# Patient Record
Sex: FEMALE | Race: OTHER | URBAN - METROPOLITAN AREA
[De-identification: names, ages, dates, MRNs, and addresses within clinical notes are randomized per-mention and may not be internally consistent; named-entity substitution may affect disease eponyms.]

---

## 2019-10-22 ENCOUNTER — HOSPITAL ENCOUNTER (OUTPATIENT)
Dept: LAB | Age: 34
Discharge: HOME | End: 2019-10-22

## 2019-10-22 ENCOUNTER — OFFICE VISIT (OUTPATIENT)
Dept: FAMILY MEDICINE CLINIC | Age: 34
End: 2019-10-22

## 2019-10-22 VITALS
HEART RATE: 83 BPM | HEIGHT: 65 IN | SYSTOLIC BLOOD PRESSURE: 170 MMHG | WEIGHT: 208 LBS | BODY MASS INDEX: 34.66 KG/M2 | TEMPERATURE: 98 F | DIASTOLIC BLOOD PRESSURE: 111 MMHG

## 2019-10-22 DIAGNOSIS — Z83.3 FAMILY HISTORY OF DIABETES MELLITUS (DM): ICD-10-CM

## 2019-10-22 DIAGNOSIS — E66.3 OVERWEIGHT: ICD-10-CM

## 2019-10-22 DIAGNOSIS — R03.0 ELEVATED BLOOD-PRESSURE READING WITHOUT DIAGNOSIS OF HYPERTENSION: ICD-10-CM

## 2019-10-22 DIAGNOSIS — Z87.42 HISTORY OF PCOS: ICD-10-CM

## 2019-10-22 DIAGNOSIS — G44.201 INTRACTABLE TENSION-TYPE HEADACHE, UNSPECIFIED CHRONICITY PATTERN: ICD-10-CM

## 2019-10-22 DIAGNOSIS — Z13.9 ENCOUNTER FOR SCREENING: Primary | ICD-10-CM

## 2019-10-22 DIAGNOSIS — E66.01 SEVERE OBESITY (HCC): ICD-10-CM

## 2019-10-22 LAB
ALBUMIN SERPL-MCNC: 4.7 G/DL (ref 3.5–5)
ALBUMIN/GLOB SERPL: 1.2 {RATIO} (ref 1.1–2.2)
ALP SERPL-CCNC: 93 U/L (ref 45–117)
ALT SERPL-CCNC: 85 U/L (ref 12–78)
ANION GAP SERPL CALC-SCNC: 0 MMOL/L (ref 5–15)
AST SERPL-CCNC: 35 U/L (ref 15–37)
BILIRUB SERPL-MCNC: 0.7 MG/DL (ref 0.2–1)
BUN SERPL-MCNC: 12 MG/DL (ref 6–20)
BUN/CREAT SERPL: 18 (ref 12–20)
CALCIUM SERPL-MCNC: 9.8 MG/DL (ref 8.5–10.1)
CHLORIDE SERPL-SCNC: 103 MMOL/L (ref 97–108)
CO2 SERPL-SCNC: 32 MMOL/L (ref 21–32)
CREAT SERPL-MCNC: 0.67 MG/DL (ref 0.55–1.02)
EST. AVERAGE GLUCOSE BLD GHB EST-MCNC: 137 MG/DL
FSH SERPL-ACNC: 5.2 MIU/ML
GLOBULIN SER CALC-MCNC: 3.9 G/DL (ref 2–4)
GLUCOSE SERPL-MCNC: 158 MG/DL (ref 65–100)
HBA1C MFR BLD: 6.4 % (ref 4.2–6.3)
HCG URINE, QL. (POC): NEGATIVE
LH SERPL-ACNC: 6 MIU/ML
POTASSIUM SERPL-SCNC: 3.7 MMOL/L (ref 3.5–5.1)
PROT SERPL-MCNC: 8.6 G/DL (ref 6.4–8.2)
SODIUM SERPL-SCNC: 135 MMOL/L (ref 136–145)
T4 FREE SERPL-MCNC: 1 NG/DL (ref 0.8–1.5)
TSH SERPL DL<=0.05 MIU/L-ACNC: 1.37 UIU/ML (ref 0.36–3.74)
VALID INTERNAL CONTROL?: YES

## 2019-10-22 PROCEDURE — 83036 HEMOGLOBIN GLYCOSYLATED A1C: CPT

## 2019-10-22 PROCEDURE — 84439 ASSAY OF FREE THYROXINE: CPT

## 2019-10-22 PROCEDURE — 84443 ASSAY THYROID STIM HORMONE: CPT

## 2019-10-22 PROCEDURE — 83001 ASSAY OF GONADOTROPIN (FSH): CPT

## 2019-10-22 PROCEDURE — 82670 ASSAY OF TOTAL ESTRADIOL: CPT

## 2019-10-22 PROCEDURE — 80053 COMPREHEN METABOLIC PANEL: CPT

## 2019-10-22 PROCEDURE — 82627 DEHYDROEPIANDROSTERONE: CPT

## 2019-10-22 PROCEDURE — 84146 ASSAY OF PROLACTIN: CPT

## 2019-10-22 NOTE — PROGRESS NOTES
Assessment/Plan:    Diagnoses and all orders for this visit:    1. Encounter for screening  -     AMB POC URINE PREGNANCY TEST, VISUAL COLOR COMPARISON    2. Severe obesity ([address]  -     TSH 3RD GENERATION; Future  -     T4, FREE; Future  -     HEMOGLOBIN A1C WITH EAG; Future    3. Elevated blood-pressure reading without diagnosis of hypertension  -     METABOLIC PANEL, COMPREHENSIVE; Future    4. Overweight  -     TSH 3RD GENERATION; Future  -     T4, FREE; Future  -     HEMOGLOBIN A1C WITH EAG; Future    5. Intractable tension-type headache, unspecified chronicity pattern    6. History of PCOS  -     FSH AND LH; Future  -     PROLACTIN; Future  -     ESTROGENS, FRACTIONATED; Future  -     DHEA SULFATE; Future    7. Family history of diabetes mellitus (DM)  -     HEMOGLOBIN A1C WITH EAG; Future    Close f/up  Get records from ER visit to [facility] one month ago   ? exophthalmus or just fatigue? Follow-up and Dispositions    · Return in about 2 weeks (around 11/5/2019). CARMELLA Allred expressed understanding of this plan. An AVS was printed and given to the patient.      ----------------------------------------------------------------------    Chief Complaint   Patient presents with    Elevated Blood Pressure       History of Present Illness:  37 yo new pt presents for eval of new left sided forehead headache. No current headache. The pain started over a month ago and she went to [facility] ER and was given a \"shot in my vein\" and then the headache went away. At that time, she had normal BP she states. 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 She has hx of PCOS and has not had a period since 2/19. She was on metformin for this problem before moving to the [address] [place] [address] one year ago. She has gained weight since moving here as well.  Her mom has diabetes  The headaches come maybe once a week and last 2 hours. Not associated with any thing that she can tell - for ex no chemicals are used at work, she doesn't have vision problems. She has no nasal or throat problems  She is a non smoker  She is a mom of a 15 yo and . There is no stress at home she states  She is not on any medication daily   She takes tylenol for her headache and this takes the pain away  She has no chest pain       No past medical history on file. No Known Allergies    Social History     Tobacco Use    Smoking status: Never Smoker    Smokeless tobacco: Never Used   Substance Use Topics    Alcohol use: Never     Frequency: Never    Drug use: Never       No family history on file.     Physical Exam:     Visit Vitals  BP (!) 170/111 (BP 1 Location: Right arm, BP Patient Position: Sitting)   Pulse 83   Temp 98 °F (36.7 °C) (Oral)   Ht 5' 4.57\" (1.64 m)   Wt 208 lb (94.3 kg)   LMP 02/22/2019   BMI 35.08 kg/m²     gen looks tired, eyes appear a little protruding but pulse rate is normal   A&Ox3  WDWN NAD  Respirations normal and non labored  PERRLA, no a/v nicking/banking  Neuro CN 2-12 intact  Thyroid- no masses felt, gland feels symmetrically full  Affect is blunted but may be due to no sleep overnight

## 2019-10-22 NOTE — PATIENT INSTRUCTIONS
Cómo comenzar un plan para bajar de peso: Instrucciones de cuidado - [ Starting a Weight Loss Plan: Care Instructions ]  Instrucciones de cuidado    Si está pensando en adelgazar, puede que le resulte difícil saber por dónde empezar. [place] médico puede ayudarle a preparar un plan para bajar de peso que se ajuste mejor a roxanne necesidades. Es posible que prefiera damien iliana clase de nutrición o ejercicio, o unirse a un matthew de [de-identified] para adelgazar. Si tiene preguntas sobre cómo cambiar roxanne hábitos alimenticios o rutina de ejercicio, pregúntele a [place] médico sobre la posibilidad de consultar a un dietista registrado o a un especialista en ejercicio. Bajar de peso puede ser un gran desafío. No tiene que hacer grandes cambios de repente. Keven Burns y Tammy. Cuando esos cambios se conviertan en un hábito, incorpore algunos [employer]. Si marjorie que no está listo para hacer cambios en gianni momento, escoja iliana fecha en el futuro. Mitch iliana karo con fr[place] médico para determinar si es apropiado que comience un plan para bajar de [place]. La atención de seguimiento es iliana parte clave de france tratamiento y seguridad. Asegúrese de hacer y acudir a todas las citas, y llame a france médico si está teniendo problemas. También es iliana buena idea saber los resultados de roxanne exámenes y mantener iliana lista de los medicamentos que brianna. ¿Cómo puede cuidarse en el hogar? · Establezca metas realistas. Muchas personas esperan perder más peso del que es probable. Perder el 5% a 10% del peso corporal podría ser suficiente para mejorar france jaclyn. · Mitch que france jocelyne y roxanne amigos se involucren para brindarle apoyo. Hable con ellos sobre las razones por las que Rohini Flora y [facility]. Pueden ayudarle si participan en roxanne rutinas de ejercicio y comen con usted, aunque es posible que coman algo diferente. · Busque lo que funcione mejor para usted.  Si no tiene tiempo o no le gusta cocinar, un programa que ofrezca barras o batidos carina sustitutos de comida podría ser el más adecuado para usted. Shameka si le gusta cocinar, lo mejor puede ser un plan que incluya menús y recetas diarios. · Pregúntele a miller médico acerca de otros profesionales de la jaclyn que puedan ayudarle a alcanzar roxanne objetivos para bajar de [place]. ? Un dietista puede ayudarle a introducir cambios saludables en miller dieta. ? Un especialista en ejercicio o entrenador personal pueden ayudarle a desarrollar un programa de ejercicio Georgena Carol y seguro. ? Un consejero o psiquiatra puede ayudarle a lidiar con la depresión, la ansiedad u otros problemas familiares que puedan interponerse en miller propósito para adelgazar. · Considere unirse a un matthew de [de-identified] de personas que tratan de adelgazar. [place] médico puede recomendarle grupos de apoyo en miller localidad. ¿Dónde puede encontrar más información en inglés? Stevo Lambert a http://manjula-ruperto.[email or web address] Pat Meneses [id number] en la búsqueda para aprender más acerca de \"Cómo comenzar un plan para bajar de peso: Instrucciones de cuidado - [ Starting a Weight Loss Plan: Care Instructions ]. \"  Revisado: 28 marzo, 2019  Versión del contenido: 12.2  © 7880-7706 Healthwise, Incorporated. Las instrucciones de cuidado fueron adaptadas bajo licencia por [employer] (which disclaims liability or warranty for this information). Si usted tiene [place] afección médica o sobre estas instrucciones, siempre pregunte a miller profesional de jaclyn. Healthwise, Incorporated niega toda garantía o responsabilidad por miller uso de esta información. Presión arterial elevada: Instrucciones de cuidado - [ Elevated Blood Pressure: Care Instructions ]  Instrucciones de cuidado    La presión arterial es iliana medida de la fuerza que ejerce la izaiah contra las zhang de las arterias. Es normal que la presión arterial suba y baje a lo anahy del día. Shameka si se mantiene preethi por un tiempo, usted tiene presión arterial preethi.   Dos números indican miller presión arterial. El primer número es la presión sistólica. Muestra qué tan ursula presiona la izaiah cuando el corazón está bombeando. El henry número es la presión diastólica. Muestra qué tan ursula presiona la [employer] latidos, cuando el corazón está relajado y llenándose de Ka[place]. Eliana Lissette arterial ideal para adultos es menos de 120/80 (diga \"120 sobre 80\"). La presión arterial preethi es de 140/90 o superior. Usted tiene la presión arterial preethi si el número de [place] es 140 o superior o el número de abajo es 90 o superior, o ambas cosas. La prueba principal para la presión arterial preethi es simple, rápida e indolora. Para diagnosticar presión arterial preethi, france médico examinará france presión arterial en momentos diferentes. Después de tomarle la presión, es posible que fr[place] médico le pida que se vuelva a damien la presión en casa. Si se le diagnostica presión arterial preethi, puede colaborar con france médico para elaborar un plan a anahy plazo para manejarla. La atención de seguimiento es iliana parte clave de france tratamiento y seguridad. Asegúrese de hacer y acudir a todas las citas, y llame a france médico si está teniendo problemas. También es iliana buena idea saber los resultados de los exámenes y mantener iliana lista de los medicamentos que brianna. ¿Cómo puede cuidarse en el hogar? · No fume. Fumar aumenta france riesgo de ataque cerebral y ataque al corazón. Si necesita ayuda para dejarlo, hable con fr[place] médico sobre programas y medicamentos para dejar de fumar. Estos pueden aumentar roxanne probabilidades de dejar de fumar para siempre. · Mantenga un peso saludable. · Trate de limitar la cantidad de sodio que ingiere a menos de 2,300 miligramos (mg) al día. [place] médico podría pedirle que trate de ingerir menos de 1,500 mg al día. · Manténgase físicamente activo. Mitch al menos 30 minutos de ejercicio la mayoría de los días de la [place]. Caminar es iliana buena opción.  Es posible que [address] [place] [address] correr, nadar, montar en bicicleta, o practicar tenis o deportes de equipo. · No tome alcohol o limite la cantidad que calvin. Hable con france médico acerca de si puede damien alcohol. · Coma abundantes frutas, verduras y productos lácteos bajos en grasa. Consuma menos grasa saturada y total.  · Aprenda a revisarse la presión arterial en france hogar. ¿Cuándo debe pedir ayuda? Llame a france médico ahora mismo o busque atención médica inmediata si:  ? · [place] presión arterial es mucho más preethi de lo normal (carina 180/110 o superior). ? · Kira que la presión arterial preethi está causando síntomas carina:  ¨ Dolor de ayden intenso. [address] ? Vigile muy de cerca los cambios en france jaclyn, y asegúrese de comunicarse con france médico si:  ? · No mejora carina se esperaba. ¿Dónde puede encontrar más información en inglés? Elinda Bene a http://manjula-ruperto.[email or web address] Nellie Severs D311 en la búsqueda para aprender más acerca de \"Presión arterial elevada: Instrucciones de cuidado - [ Elevated Blood Pressure: Care Instructions ]. \"  Revisado: 21 septiembre, 2016  Versión del contenido: 11.4  © [id number] Healthwise, Incorporated. Las instrucciones de cuidado fueron adaptadas bajo licencia por [employer] (which disclaims liability or warranty for this information). Si usted tiene [place] afección médica o sobre estas instrucciones, siempre pregunte a fr[place] profesional de jaclyn. Healthwise, Incorporated niega toda garantía o responsabilidad por [place] uso de esta información.

## 2019-10-22 NOTE — PROGRESS NOTES
Results for orders placed or performed in visit on 10/22/19   AMB POC URINE PREGNANCY TEST, VISUAL COLOR COMPARISON   Result Value Ref Range    VALID INTERNAL CONTROL POC Yes     HCG urine, Ql. (POC) Negative Negative

## 2019-10-23 DIAGNOSIS — E11.9 DM TYPE 2, GOAL HBA1C < 7% (HCC): Primary | ICD-10-CM

## 2019-10-23 LAB — PROLACTIN SERPL-MCNC: 10.1 NG/ML

## 2019-10-23 RX ORDER — METFORMIN HYDROCHLORIDE 500 MG/1
TABLET ORAL
Qty: 60 TAB | Refills: 2 | Status: SHIPPED | OUTPATIENT
Start: 2019-10-23 | End: 2019-11-12

## 2019-10-23 NOTE — PROGRESS NOTES
T/c to pt to discuss new dx of DM 2. She answered the phone and we discussed the results. She will start on metformin 500 mg one po with dinner for the first week then increase to 2 with dinner. I have an appt with her at that time and will make changes then. She understands that she needs to make diet and lifestyle changes as well.

## 2019-10-24 LAB
DHEA-S SERPL-MCNC: 227.5 UG/DL (ref 84.8–378)
ESTRADIOL SERPL-MCNC: 34.9 PG/ML
ESTRONE SERPL-MCNC: 144 PG/ML

## 2019-10-25 NOTE — PROGRESS NOTES
After review of all of her labs, the most likely dx is PCOS. Can add a testosterone lab at f/up and/or do progesterone challenge.  Recent dx of DM to add to this

## 2019-11-12 ENCOUNTER — OFFICE VISIT (OUTPATIENT)
Dept: FAMILY MEDICINE CLINIC | Age: 34
End: 2019-11-12

## 2019-11-12 VITALS
DIASTOLIC BLOOD PRESSURE: 99 MMHG | HEIGHT: 65 IN | TEMPERATURE: 98.2 F | BODY MASS INDEX: 34.49 KG/M2 | HEART RATE: 82 BPM | WEIGHT: 207 LBS | OXYGEN SATURATION: 99 % | SYSTOLIC BLOOD PRESSURE: 154 MMHG

## 2019-11-12 DIAGNOSIS — Z23 ENCOUNTER FOR IMMUNIZATION: ICD-10-CM

## 2019-11-12 DIAGNOSIS — E11.9 DM TYPE 2, GOAL HBA1C < 7% (HCC): ICD-10-CM

## 2019-11-12 DIAGNOSIS — I10 ESSENTIAL HYPERTENSION: ICD-10-CM

## 2019-11-12 DIAGNOSIS — E66.9 OBESITY (BMI 35.0-39.9 WITHOUT COMORBIDITY): ICD-10-CM

## 2019-11-12 DIAGNOSIS — R73.09 ELEVATED HEMOGLOBIN A1C: Primary | ICD-10-CM

## 2019-11-12 DIAGNOSIS — E11.9 DM TYPE 2, GOAL A1C TO BE DETERMINED (HCC): ICD-10-CM

## 2019-11-12 LAB — GLUCOSE POC: NORMAL MG/DL

## 2019-11-12 RX ORDER — METFORMIN HYDROCHLORIDE 500 MG/1
TABLET ORAL
Qty: 60 TAB | Refills: 2 | Status: SHIPPED | OUTPATIENT
Start: 2019-11-12 | End: 2020-02-11 | Stop reason: SDUPTHER

## 2019-11-12 RX ORDER — METOPROLOL SUCCINATE 50 MG/1
50 TABLET, EXTENDED RELEASE ORAL DAILY
Qty: 30 TAB | Refills: 2 | Status: SHIPPED | OUTPATIENT
Start: 2019-11-12 | End: 2020-02-11

## 2019-11-12 NOTE — PATIENT INSTRUCTIONS
Aprenda acerca de la planificación de comidas para la diabetes - [ Learning About Meal Planning for Diabetes ]  ¿Por qué planificar las comidas? La planificación de comidas puede ser Deysi Adams parte crucial del manejo de la diabetes. Planificar las comidas y los refrigerios con el equilibrio correcto de carbohidratos, proteínas y grasas puede ayudarle a mantener los niveles de azúcar en la izaiah dentro de los límites ideales que estableció junto con france médico.  No tiene que comer alimentos especiales. Puede comer lo mismo que france jocelyne, incluso dulces de vez en cuando. Shameka debe prestar atención a la cantidad y la frecuencia con que come ciertos alimentos. Jerome vez desee colaborar con un dietista o un educador en diabetes certificado. Él o benny puede darle consejos y sugerencias de comidas y puede responder a roxanne preguntas sobre la planificación de comidas. Sera profesional sanitario también puede ayudarle a alcanzar un peso saludable si serge es jann de roxanne objetivos. ¿Qué plan es adecuado para usted? [place] dietista o educador en diabetes puede sugerirle que empiece con el formato de plato o el recuento de carbohidratos. Formato de plato  El formato de plato es iliana manera sencilla de ayudarle a controlar la manera en que se [place]. Usted planifica roxanne comidas aprendiendo a robert la cantidad de espacio que cada alimento debería ocupar en un plato. Usar el formato de plato le ayuda a distribuir los carbohidratos cheryl el día. Puede hacer que le resulte más fácil mantener el nivel de azúcar en la izaiah dentro de los límites ideales. También le ayuda a robert si está comiendo porciones de un tamaño saludable. Para usar el formato de plato, llene la mitad del plato con verduras sin almidón. Añada carne o sustitutos de carne en un cuarto del plato. Ponga iliana verdura con almidón o un grano (carina arroz integral o iliana papa) en el último cuarto del plato.  Puede agregar Deysi Elena de fruta y [address] o yogur descremado o semidescremado, dependiendo de france meta de carbohidratos para cada comida. Estos son algunos consejos para usar el formato de plato:  · Asegúrese de no estar usando un plato demasiado edda. Lo mejor es usar un plato de 9 pulgadas (23 cm). Muchos restaurantes usan platos más grandes. · Acostúmbrese a usar el formato de plato en casa. De serge modo puede luego usarlo cuando coma afuera. · Anote las preguntas que tenga acerca de usar el formato de [place]. Hable con france médico, dietista o educador en diabetes sobre roxanne inquietudes. Recuento de carbohidratos  Con el recuento de carbohidratos, usted planifica las comidas de acuerdo a la cantidad de carbohidratos en cada alimento. Los carbohidratos aumentan el nivel de azúcar en la [place] y con mayor rapidez que otros nutrientes. Se encuentran en postres, panes y cereales y en la fruta. También se encuentran en las verduras con almidón, tales carina las devan y [employer], los granos carina el arroz y la pasta, así carina en la [place] y el yogur. Distribuir el consumo de carbohidratos a lo anahy del día le ayuda a mantener el azúcar en la izaiah en los límites ideales. [place] cantidad diaria depende de varios factores, incluyendo france peso, nivel de [place], los [place] brinana para la diabetes y los objetivos que tenga para roxanne niveles de azúcar en la izaiah. Un dietista registrado o un educador en diabetes puede ayudarle a planear cuántos carbohidratos incluir en cada comida y refrigerio. Iliana guía para la cantidad diaria de carbohidratos es:  · Entre 39 y 61 gramos en cada comida. Eso equivale a 3 o 4 porciones de carbohidratos, aproximadamente. · Entre 15 y 21 gramos para cada refrigerio. Eso equivale a 1 porción de carbohidratos, aproximadamente. La etiqueta nutricional de los alimentos envasados le indica la cantidad de carbohidratos que hay en iliana porción de serge alimento. [place], bhanu cuál es el tamaño de la porción que indica la [place].  ¿Es stephanie porción la cantidad que usted come de Annalisa calvo? Toda la información nutricional en iliana etiqueta se basa en el tamaño de la porción. Así que si usted come Annalisa mayor o phan cantidad, tendrá [employer] cifras. La cantidad total de carbohidratos es lo siguiente que debe buscar en la etiqueta. Si cuenta las porciones de carbohidratos, iliana porción de carbohidratos equivale a 15 gramos. Para los alimentos que no tienen etiquetas, carina las frutas y las verduras frescas, valerie necesitará iliana guía que enumere la cantidad de carbohidratos que contienen esos alimentos. Pregúntele a france médico, dietista o educador en diabetes acerca de libros o guías de nutrición que [employer]. Si [place] insulina, necesita saber cuántos gramos de carbohidratos hay en iliana comida. [place] le permite saber cuánta insulina de acción rápida necesita antes de comer. Si [place] iliana bomba de [place], valerie recibe [place] cantidad walter de insulina a lo anahy del día. Por lo tanto, debe programar la bomba en las comidas para que le suministre insulina adicional a fin de cubrir el aumento del azúcar en la izaiah después de las comidas. Cuando valerie sabe qué cantidad de carbohidratos consumirá, puede programar la cantidad correcta de [place]. O si [place] siempre la misma dosis de insulina, tendrá que asegurarse de consumir la misma cantidad de carbohidratos en cada comida. Si necesita ayuda adicional para comprender el recuento de carbohidratos y las etiquetas de nutrición, pregúntele a france médico, dietista o educador en diabetes. ¿Cómo puede empezar a usar la planificación de comidas? Estos son algunos consejos para empezar:  · Planifique las comidas para toda la semana por anticipado. No se olvide de incluir los refrigerios. · Use libros de cocina o recetas en Internet para planificar varias comidas principales. Planifique algunas comidas rápidas para las noches en las que esté ocupado.  También puede cocinar el doble de algunas comidas que se puedan congelar. Puede guardar la mitad para otras noches en las que esté ocupado y no tenga tiempo para cocinar. · Asegúrese de que tenga los ingredientes que necesita para roxanne recetas. Si tiene poca cantidad de algunos artículos básicos, añádalos a la lista de la compra. · Mitch iliana lista de alimentos que [place] para preparar desayunos, almuerzos y refrigerios. Anote cantidades abundantes de frutas y verduras. · Coloque esta lista en la freddy del refrigerador. Siga añadiendo cosas según se le Thelda Guard ocurriendo. · Lleve la lista consigo cuando vaya a hacer las compras semanales. La atención de seguimiento es iliana parte clave de france tratamiento y seguridad. Asegúrese de hacer y acudir a todas las citas, y llame a france médico si está teniendo problemas. También es iliana buena idea saber los resultados de roxanne exámenes y mantener iliana lista de los medicamentos que brianna. ¿Dónde puede encontrar más información en inglés? Tricia Andrews a [email or web address]-ruperto.[email or web address] Ilana [place] en la búsqueda para aprender más acerca de \"Aprenda acerca de la planificación de comidas para la diabetes - [ Learning About Meal Planning for Diabetes ]. \"  Revisado: 16 jazmine, 2019  Versión del contenido: 12.2  © 6775-9318 Healthwise, Incorporated. Las instrucciones de cuidado fueron adaptadas bajo licencia por [employer] (which disclaims liability or warranty for this information). Si usted tiene [place] afección médica o sobre estas instrucciones, siempre pregunte a france profesional de jaclyn. Healthwise, Incorporated niega toda garantía o responsabilidad por france uso de esta información. Aprenda sobre la presión arterial preethi - [ Learning About High Blood Pressure ]  ¿Qué es la presión arterial preethi? La presión arterial es iliana medida de la fuerza que ejerce la izaiah contra las zhang de las arterias.  Es normal que la presión arterial suba y baje a lo anahy del día, deanna si se mantiene preethi, usted tiene la presión arterial preethi. Otro nombre para la presión arterial preethi es hipertensión. Dos números le indican france presión arterial. El primer número indica la presión sistólica. Esta muestra qué tan ursula presiona la izaiah cuando el corazón está bombeando. El henry número indica la presión diastólica. Esta muestra qué tan ursula presiona la [employer] latidos, cuando el corazón está relajado y se está llenando de [place]. [place] médico le dará un objetivo de presión arterial. El objetivo se basará en france jaclyn y france edad. La presión arterial preethi (hipertensión) significa que el número de [place] se [address], o el número de abajo permanece alto, o ambas cosas. La presión arterial preethi aumenta el riesgo de ataque cerebral, ataque cardíaco y otros problemas. Usted y france médico hablarán sobre los riesgos de estos problemas en relación con france presión arterial.  ¿Qué ocurre cuando tiene presión arterial preethi? · La izaiah fluye por las arterias con Haroldo Crooked. Con el tiempo, esto daña las zhang de las arterias. Shameka usted no puede sentirlo. La presión arterial preethi generalmente no causa síntomas. · La grasa y el calcio empiezan a acumularse en las arterias. Esta acumulación se llama placa. La placa hace que las arterias arbaella más estrechas y [place]. La izaiah no puede fluir a través de ellas tan fácilmente. · Esta falta de un buen flujo de izaiah empieza a dañar [employer] de france cuerpo. [place] puede conducir a problemas carina la enfermedad de las arterias coronarias y un ataque cardíaco, insuficiencia cardíaca, ataque cerebral, insuficiencia renal y [de-identified] a los ojos. ¿Cómo puede prevenir la presión arterial preethi? · Mantenga un peso saludable. · Trate de limitar la cantidad de sodio que consume a menos de 2,300 miligramos (mg) al día. Si limita france consumo de sodio a 1,500 mg al día, puede reducir france presión arterial aún más.   ? Compre alimentos Peggy brooks \"sin sal\" (\"unsalted\"), [place] de sodio\" (\"sodium-free\") o \"bajo en sodio\" (\"low-sodium\"). Los alimentos que indiquen en la etiqueta \"reducido en sodio\" (\"reduced-sodium\") y \"poco sodio\" (\"light sodium\") aún pueden contener demasiado sodio. ? Sazone roxanne comidas con ajo, jugo de yash, cebolla, vinagre, hierbas y especias en lugar de sal. No use salsa de soya, salsa para dionne, sal de cebolla, sal de ajo, mostaza ni ketchup (salsa de tomate) en roxanne alimentos. ? Use menos sal (o nada) de lo que indique la receta. Por lo general, puede añadir la mitad de la cantidad de debbie que pide la receta sin que la comida pierda el sabor. · Manténgase activo físicamente. [place] por lo menos 30 minutos de ejercicio la mayoría de los días de la [place]. Caminar es iliana buena opción. Zeinab Rizo desee hacer otras actividades, carina gaurang su, [employer], jugar al tenis o practicar otros deportes de equipo. · Limite el alcohol a 2 bebidas al día para los hombres y 1 bebida al día para las mujeres. · Coma muchas frutas, verduras y productos lácteos bajos en grasa. Coma menos grasas saturadas y grasas totales. ¿Cómo se trata la presión arterial preethi? · Miller médico sugerirá que randolph cambios en miller estilo de hanna para ayudar al corazón. Por ejemplo, miller médico podría pedirle que coma alimentos saludables, deje de fumar, baje el peso que tenga de [facility] orlemirna y [address]. · Si los cambios de [facility] de hanna no [place] branch, miller médico podría recomendarle que tome medicamentos. · Cuando la presión arterial es muy preethi, se necesitan medicamentos para bajarla. La atención de seguimiento es iliana parte clave de miller tratamiento y seguridad. Asegúrese de hacer y acudir a todas las citas, y llame a miller médico si está teniendo problemas. También es iliana buena idea saber los resultados de roxanne exámenes y mantener iliana lista de los medicamentos que brianna. ¿Dónde puede encontrar más información en inglés? Jerson Henry a http://manjula-ruperto.[email or web address]   Serena Severs P50 en la búsqueda para aprender más acerca de \"Aprenda sobre la presión arterial preethi - [ Learning About High Blood Pressure ]. \"  Revisado: 9 abril, 2019  Versión del contenido: 12.2  © 0006-4162 [employer]. Las instrucciones de cuidado fueron adaptadas bajo licencia por [employer] (which disclaims liability or warranty for this information). Si usted tiene [place] afección médica o sobre estas instrucciones, siempre pregunte a france profesional de jaclyn. [employer] niega toda garantía o responsabilidad por france uso de esta información.

## 2019-11-12 NOTE — PROGRESS NOTES
Coordination of Care  1. Have you been to the ER, urgent care clinic since your last visit? Hospitalized since your last visit? No    2. Have you seen or consulted any other health care providers outside of the [address] [place] [address] since your last visit? Include any pap smears or colon screening. No    Does the patient need refills? YES    Learning Assessment Complete?  yes  Depression Screening complete in the past 12 months? yes  Results for orders placed or performed in visit on 11/12/19   AMB POC GLUCOSE BLOOD, BY GLUCOSE MONITORING DEVICE   Result Value Ref Range    Glucose POC nf 131 mg/dL

## 2019-11-12 NOTE — PROGRESS NOTES
[address] [facility] 190  Requests flu vaccine. Denies fever and egg allergy. VIS information sheet given to patient. Explained possible s/e. Reviewed s/sx indicating need to be seen in ER. Patient had no adverse reaction at time of discharge. Entered into VIIS. GIVEN BY ERNESTINA YOU LPN.  Janice Ding RN

## 2019-12-10 ENCOUNTER — OFFICE VISIT (OUTPATIENT)
Dept: FAMILY MEDICINE CLINIC | Age: 34
End: 2019-12-10

## 2019-12-10 VITALS — HEIGHT: 64 IN | WEIGHT: 203 LBS | BODY MASS INDEX: 34.66 KG/M2

## 2019-12-10 DIAGNOSIS — Z71.3 DIETARY COUNSELING AND SURVEILLANCE: Primary | ICD-10-CM

## 2019-12-10 NOTE — PROGRESS NOTES
DATE: 12/10/2019      REFERRING PHYSICIAN: Vilma Singh  NAME: Denzel Griffin : 1985 AGE: 36 y.o. GENDER: female  REASON FOR VISIT: elevated A1C    ASSESSMENT:  Past Medical Hx: high blood pressure      LABS:   Lab Results   Component Value Date/Time    Hemoglobin A1c 6.4 (H) 10/22/2019 12:32 PM       MEDICATIONS/SUPPLEMENTS:     Prior to Admission medications    Medication Sig Start Date End Date Taking? Authorizing Provider   metFORMIN (GLUCOPHAGE) 500 mg tablet Si po bid with meals. [place] [address] [facility] al flores con comida 19   Vilma Singh PA   metoprolol succinate (TOPROL-XL) 50 mg XL tablet Take 1 Tab by mouth daily. 19   Vilma Singh PA       FOOD ALLERGIES/INTOLERANCES: NA    ANTHROPOMETRICS:    Ht Readings from Last 1 Encounters:   12/10/19 5' 4\" (1.626 m)      Wt Readings from Last 1 Encounters:   12/10/19 203 lb (92.1 kg)      IBW:120 # +/- 10%  %IBW: 169 % +/- 10%    BMI: 34.8 Category: obesity class II    Reported Wt Hx: no change    Estimated Nutritional Needs:   7128-9128 kcals    Reported Diet Hx:     24 Hour Diet Recall  Breakfast  Beans, pureed  Cream     Lunch     Dinner  Cottage cheese  Avocado  Tomato, fried  [place]illa, 1   Snacks  fruit   Beverages  Milk, whole  Juice, aloe     Exercise/Physical Actvity:    None        Environmental/Social:    Works at [employer] every night 3:30pm-2am  Sleeps a couple hours, then eats something and then back to bed until 11:30am          NUTRITION INTERVENTION:    RD introduced CHO foods and how they affect BG. Discussed that some foods have added sugar and some have naturally occurring sugars. Using food models, RD displayed common CHO foods and their appropriate portion sizes. Emphasized that these foods need to be limited, portioned and always eaten in combination with PRO. Introduced MyPlate and discussed how it can help to ensure both balance and variety.    Reviewed the food groups and what each group contributes to our bodies. PATIENT GOALS:      Specific tips and techniques to facilitate compliance with above recommendations were provided and discussed. Pt was strongly encouraged to begin making necessary changes now, and re-visit the dietitian prn.             Jose Antonio Potter RD

## 2020-01-14 ENCOUNTER — OFFICE VISIT (OUTPATIENT)
Dept: FAMILY MEDICINE CLINIC | Age: 35
End: 2020-01-14

## 2020-01-14 VITALS — WEIGHT: 203 LBS | BODY MASS INDEX: 34.66 KG/M2 | HEIGHT: 64 IN

## 2020-01-14 DIAGNOSIS — Z71.3 DIETARY COUNSELING AND SURVEILLANCE: Primary | ICD-10-CM

## 2020-01-14 NOTE — PROGRESS NOTES
Dave Class was seen for F/U wt loss and elevated A1C  [facility] # [id number]  No change in wt  Pt states no changes in diet  Pt missed appt with provider in December, discussed importance of rescheduling  No longer working  Reviewed CHO foods and appropriate portion sizes, and MyPlate  Wake up 5am and eat breakfast at 8am  Last meal between 5-6 pm  RD unsure of pt motivation to make changes but pt requests F/U with RD.  F/U in 2 months

## 2020-02-11 ENCOUNTER — OFFICE VISIT (OUTPATIENT)
Dept: FAMILY MEDICINE CLINIC | Age: 35
End: 2020-02-11

## 2020-02-11 VITALS
WEIGHT: 200.4 LBS | HEART RATE: 79 BPM | TEMPERATURE: 98.4 F | BODY MASS INDEX: 34.4 KG/M2 | SYSTOLIC BLOOD PRESSURE: 178 MMHG | DIASTOLIC BLOOD PRESSURE: 118 MMHG

## 2020-02-11 DIAGNOSIS — I10 ESSENTIAL HYPERTENSION: Primary | ICD-10-CM

## 2020-02-11 DIAGNOSIS — E28.2 PCOS (POLYCYSTIC OVARIAN SYNDROME): ICD-10-CM

## 2020-02-11 DIAGNOSIS — Z13.9 ENCOUNTER FOR SCREENING: ICD-10-CM

## 2020-02-11 DIAGNOSIS — Z23 ENCOUNTER FOR IMMUNIZATION: ICD-10-CM

## 2020-02-11 DIAGNOSIS — E11.9 DM TYPE 2, GOAL HBA1C < 7% (HCC): ICD-10-CM

## 2020-02-11 LAB
GLUCOSE POC: NORMAL MG/DL
HCG URINE, QL. (POC): NEGATIVE
VALID INTERNAL CONTROL?: YES

## 2020-02-11 RX ORDER — METFORMIN HYDROCHLORIDE 500 MG/1
TABLET ORAL
Qty: 60 TAB | Refills: 3 | Status: SHIPPED | OUTPATIENT
Start: 2020-02-11 | End: 2020-03-20 | Stop reason: SDUPTHER

## 2020-02-11 RX ORDER — ATENOLOL 100 MG/1
100 TABLET ORAL DAILY
Qty: 90 TAB | Refills: 1 | Status: SHIPPED | OUTPATIENT
Start: 2020-02-11 | End: 2020-06-16 | Stop reason: SDUPTHER

## 2020-02-11 NOTE — PROGRESS NOTES
Coordination of Care  1. Have you been to the ER, urgent care clinic since your last visit? Hospitalized since your last visit? No    2. Have you seen or consulted any other health care providers outside of the [address] [place] [address] since your last visit? Include any pap smears or colon screening. No    Does the patient need refills? YES    Learning Assessment Complete?  yes  Depression Screening complete in the past 12 months? yes     Results for orders placed or performed in visit on 02/11/20   AMB POC GLUCOSE BLOOD, BY GLUCOSE MONITORING DEVICE   Result Value Ref Range    Glucose POC  mg/dL   AMB POC URINE PREGNANCY TEST, VISUAL COLOR COMPARISON   Result Value Ref Range    VALID INTERNAL CONTROL POC Yes     HCG urine, Ql. (POC) Negative Negative

## 2020-02-11 NOTE — PROGRESS NOTES
Assessment/Plan:       ICD-10-CM ICD-9-CM    1. Essential hypertension I10 401.9 atenoloL (TENORMIN) 100 mg tablet   2. PCOS (polycystic ovarian syndrome) E28.2 256.4 AMB POC GLUCOSE BLOOD, BY GLUCOSE MONITORING DEVICE      AMB POC URINE PREGNANCY TEST, VISUAL COLOR COMPARISON   3. Encounter for screening Z13.9 V82.9    4. DM type 2, goal HbA1c < 7% ([facility]) E11.9 250.00 metFORMIN (GLUCOPHAGE) 500 mg tablet    Tdap - cut the right thumb last Thursday, no evdicenc of infection. Follow-up and Dispositions    · Return for return 1 month recheck bp and discuss absence of menses treatment. Subjective:     Chief Complaint   Patient presents with    Follow-up     Diabetes    Avni Evans is a 36 y.o. OTHER female who speaks Sinhala.  used? yes   HPI: on metoprolol for 4 months. No headache now, has had recent headache 2 days ago. Took panadol (acetaminophen 500 mg) for her headache. Also doloneurobion which is b vitamins and diclofenac took 1 week ago. No BCP.  ROS:  No increased frequency of urination, No increased thirst;   No weight loss; No blurry vision; No chest pain, dyspnea or TIA's;   No numbness, tingling or pain in extremities; No reports of hypoglycemia. Family History: Her family history is not on file. Social History: She reports that she has never smoked. She has never used smokeless tobacco. She reports that she does not drink alcohol or use drugs. Last took medications: today  Medications:    Current Outpatient Medications   Medication Sig Dispense    metFORMIN (GLUCOPHAGE) 500 mg tablet Si po bid with meals. [place] [address] [place] [employer] veces al flores con comida 60 Tab    metoprolol succinate (TOPROL-XL) 50 mg XL tablet Take 1 Tab by mouth daily. 30 Tab         Objective:     Vitals:    20 1320 20 1325   BP: (!) 175/105 (!) 178/118   Pulse: 75 79   Temp: 98.4 °F (36.9 °C)    TempSrc: Temporal    Weight: 200 lb 6.4 oz (90.9 kg)     No LMP recorded (lmp unknown). Wt Readings from Last 2 Encounters:   02/11/20 200 lb 6.4 oz (90.9 kg)   01/14/20 203 lb (92.1 kg)     Results for orders placed or performed in visit on 02/11/20   AMB POC GLUCOSE BLOOD, BY GLUCOSE MONITORING DEVICE   Result Value Ref Range    Glucose POC  mg/dL   AMB POC URINE PREGNANCY TEST, VISUAL COLOR COMPARISON   Result Value Ref Range    VALID INTERNAL CONTROL POC Yes     HCG urine, Ql. (POC) Negative Negative        Lab Results   Component Value Date/Time    Hemoglobin A1c 6.4 (H) 10/22/2019 12:32 PM     Physical Exam:  Constitutional: She appears well-developed. Eyes: EOM are normal. Pupils are equal, round, and reactive to light. Neck: Neck supple. No thyromegaly present. Cardiovascular: Normal rate, regular rhythm, normal heart sounds and intact distal pulses. No murmur heard. Pulmonary/Chest: Effort normal and breath sounds normal.   Musculoskeletal: She exhibits no edema. No ulcers of the lower extremities. Lab Results   Component Value Date/Time    Creatinine 0.67 10/22/2019 12:32 PM     Lab Results   Component Value Date/Time    GFR est AA >60 10/22/2019 12:32 PM    GFR est non-AA >60 10/22/2019 12:32 PM     No results found for: CHOL, CHOLX, CHLST, CHOLV, HDL, HDLP, LDL, LDLC, DLDLP, TGLX, TRIGL, TRIGP, CHHD, CHHDX  Lab Results   Component Value Date/Time    ALT (SGPT) 85 (H) 10/22/2019 12:32 PM    AST (SGOT) 35 10/22/2019 12:32 PM    Alk. phosphatase 93 10/22/2019 12:32 PM    Bilirubin, total 0.7 10/22/2019 12:32 PM     Assessment/Plan:   Diagnoses and all orders for this visit:    1. Essential hypertension  -     atenoloL (TENORMIN) 100 mg tablet; Take 1 Tab by mouth daily. For blood pressure. In place of toprol XL 50 mg.    2. PCOS (polycystic ovarian syndrome)  -     AMB POC GLUCOSE BLOOD, BY GLUCOSE MONITORING DEVICE  -     AMB POC URINE PREGNANCY TEST, VISUAL COLOR COMPARISON    3. Encounter for screening    4.  DM type 2, goal HbA1c < 7% (HCC)  -     metFORMIN (GLUCOPHAGE) 500 mg tablet; Si po bid with meals. [place] [address] [place] [employer] veces al flores con comida      PCOS, not having periods. Will address next time due to uncontrolled bp today. Patient not pregnant. Blood pressure under Poor control. Greater than 50% of this 25 minute visit was spent in face-to-face counseling/coordination of care regarding diabetes management. Follow-up and Dispositions    · Return for return 1 month recheck bp and discuss absence of menses treatment. Corina Marion, NELSON, FNP-BC, BC-ADM  Kody Carroll expressed understanding of this plan.

## 2020-02-11 NOTE — PROGRESS NOTES
Printed AVS, provided to patient and reviewed. Ordered medications reviewed with pt; no coupons needed for discount. Explained to her that she should stop the Metoprolol and start new medication, Atenalol. Consent form for vaccines reviewed and signed by pt. Vaccine TDap given per protocol in R deltoid. [facility] [place] VIS statement given and reviewed. Potential side effects reviewed. Reviewed reasons to seek emergency assistance. Asked pt to wait 15 minutes after receiving vaccine to make sure she doesn't have a reaction. Follow up appointments made and reviewed with pt. All questions answered and pt verbalized understanding. Liang Read interpreted for this encounter.  Luana Doll RN

## 2020-03-20 ENCOUNTER — OFFICE VISIT (OUTPATIENT)
Dept: FAMILY MEDICINE CLINIC | Age: 35
End: 2020-03-20

## 2020-03-20 DIAGNOSIS — E11.9 DM TYPE 2, GOAL HBA1C < 7% (HCC): ICD-10-CM

## 2020-03-20 DIAGNOSIS — I10 ESSENTIAL HYPERTENSION: Primary | ICD-10-CM

## 2020-03-20 RX ORDER — METFORMIN HYDROCHLORIDE 500 MG/1
TABLET ORAL
Qty: 60 TAB | Refills: 3 | Status: SHIPPED | OUTPATIENT
Start: 2020-03-20 | End: 2020-06-16 | Stop reason: SDUPTHER

## 2020-03-20 NOTE — PATIENT INSTRUCTIONS
Current Outpatient Medications   Medication Sig Dispense Refill    metFORMIN (GLUCOPHAGE) 500 mg tablet Si po bid with meals. [place] 1 pastilla dos veces al flores con comida 60 Tab 3    atenoloL (TENORMIN) 100 mg tablet Take 1 Tab by mouth daily. For blood pressure.   In place of toprol XL 50 mg. 90 Tab 1

## 2020-03-20 NOTE — PROGRESS NOTES
Assessment/Plan:       ICD-10-CM ICD-9-CM    1. Essential hypertension I10 401.9 CT BRIEF CHECK IN BY MD/QBEBE   2. DM type 2, goal HbA1c < 7% ([facility]) E11.9 250.00 metFORMIN (GLUCOPHAGE) 500 mg tablet    [phone]  Patient to measure her blood pressure and report it to the provider next week. Follow up appointment: 3 months  Changes/recommendations made today: measure your blood pressure  Current Outpatient Medications   Medication Sig Dispense    metFORMIN (GLUCOPHAGE) 500 mg tablet Si po bid with meals. [place] 1 [employer] veces al flores con comida 60 Tab    atenoloL (TENORMIN) 100 mg tablet Take 1 Tab by mouth daily. For blood pressure. In place of toprol XL 50 mg. 90 Tab     No current facility-administered medications for this visit. [facility]  Subjective:   No chief complaint on file. Hipolito Brush is a 36 y.o. OTHER female who speaks Algerian.  used? yes   HPI: Last month is the last time she took atenolol 50 mg XL. She is taking atenolol 100 mg. Took this morning. No headaches. Refilled on 2020 for 3 months, has another refill. Still with no period. ROS:     No hypoglycemia; No pain in extremities; No numbness or tingling in extremities; No increased frequency of urination,   No blurry vision,  No weight loss,  No chest pain, dyspnea or TIA's;   Not drinking more water  Measuring glucoses? no   Hemoglobin A1c   Date Value Ref Range Status   10/22/2019 6.4 (H) 4.2 - 6.3 % Final      Social History: She reports that she has never smoked. She has never used smokeless tobacco. She reports that she does not drink alcohol or use drugs. Family History: Her family history is not on file. Objective: There were no vitals filed for this visit. No LMP recorded. No results found for any visits on 20.   Wt Readings from Last 2 Encounters:   20 200 lb 6.4 oz (90.9 kg)   20 203 lb (92.1 kg)   Physical Exam: Unable to perform due to telephone visit. Lab Results   Component Value Date/Time    ALT (SGPT) 85 (H) 10/22/2019 12:32 PM    AST (SGOT) 35 10/22/2019 12:32 PM    Alk. phosphatase 93 10/22/2019 12:32 PM    Bilirubin, total 0.7 10/22/2019 12:32 PM     Lab Results   Component Value Date/Time    Creatinine 0.67 10/22/2019 12:32 PM     Lab Results   Component Value Date/Time    GFR est AA >60 10/22/2019 12:32 PM    GFR est non-AA >60 10/22/2019 12:32 PM     Assessment/Plan:   Diagnoses and all orders for this visit:    1. Essential hypertension  -     GA BRIEF CHECK IN BY MD/QHP    2. DM type 2, goal HbA1c < 7% (HCC)  -     metFORMIN (GLUCOPHAGE) 500 mg tablet; Si po bid with meals. [place] [address] [employer] con santana Bliss, DNP, FNP-BC, BC-ADM  Tha Carroll expressed understanding of this plan.

## 2020-03-20 NOTE — PROGRESS NOTES
Avs discussed with Mono Mcguire by Discharge Nurse Tere Vargas LPN. Discussed medication prescribed today. Pt advised to get bp done over the weekend and a follow up call will be made to get the info and along to provider. Patient verbalized understanding and has no further questions.  Tere Vargas LPN

## 2020-03-23 ENCOUNTER — TELEPHONE (OUTPATIENT)
Dept: FAMILY MEDICINE CLINIC | Age: 35
End: 2020-03-23

## 2020-03-23 NOTE — TELEPHONE ENCOUNTER
Tc to the pt. With the assistance of Xu Cali as . The pt stated she went to the [facility]. They told her she had to have a pay and have a provider visit or she could by a BP machine. So the pt could not get her BP done. She did not buy a BP machine. The pt was advised to go to the [place] or [facility] they would have a free bp machine she could to one or the other and have her BP checked and write it down. The nurse stated she would call the pt Wed the pt stated she could try to get a BP by Wed. This week.  Kike Marcus RN

## 2020-03-25 NOTE — TELEPHONE ENCOUNTER
Tc to the pt using [employer] # [id number]. The pt was called to see if she had gone as we had discussed on Monday and checked her BP at the [facility]. The pt stated she had. The pt gave her reading as AY=355/72. The pt was tod that was a good BP. The pt was asked how she was feeling and she stated she is feeling very well. The pt was told the info would be given to the provider and if there was any changes to the plan the pt would be called. The pt verbalized understanding.  Sarah Ramirez RN

## 2020-04-27 ENCOUNTER — TELEPHONE (OUTPATIENT)
Dept: FAMILY MEDICINE CLINIC | Age: 35
End: 2020-04-27

## 2020-06-14 DIAGNOSIS — I10 ESSENTIAL HYPERTENSION: ICD-10-CM

## 2020-06-15 RX ORDER — ATENOLOL 100 MG/1
TABLET ORAL
Qty: 30 TAB | Refills: 0 | OUTPATIENT
Start: 2020-06-15

## 2020-06-16 ENCOUNTER — OFFICE VISIT (OUTPATIENT)
Dept: FAMILY MEDICINE CLINIC | Age: 35
End: 2020-06-16

## 2020-06-16 DIAGNOSIS — I10 ESSENTIAL HYPERTENSION: ICD-10-CM

## 2020-06-16 DIAGNOSIS — E11.9 DM TYPE 2, GOAL HBA1C < 7% (HCC): ICD-10-CM

## 2020-06-16 RX ORDER — ATENOLOL 100 MG/1
100 TABLET ORAL DAILY
Qty: 90 TAB | Refills: 1 | Status: SHIPPED | OUTPATIENT
Start: 2020-06-16 | End: 2020-09-15 | Stop reason: SDUPTHER

## 2020-06-16 RX ORDER — METFORMIN HYDROCHLORIDE 500 MG/1
TABLET ORAL
Qty: 60 TAB | Refills: 3 | Status: SHIPPED | OUTPATIENT
Start: 2020-06-16 | End: 2020-09-15 | Stop reason: SDUPTHER

## 2020-06-16 NOTE — PROGRESS NOTES
Denae Andrade is a 36 y.o. female evaluated via telephone at [phone] on 2020. Patient identification verified with 2 identifiers. Consent: She and/or health care decision maker has provided verbal consent to proceed: Yes Pursuant to the emergency declaration under the [address] [facility], [address] [facility] and the [employer] and [employer] Act, this Virtual Telephone Visit was conducted to reduce the patient's risk of exposure to COVID-19. [place] [employer]  #: [id number]  Chief Complaint   Patient presents with    Follow Up Chronic Condition     DM     HPI needs refills. Last office visit 3/2020. Was to report blood pressures to us. She bought the blood pressure machine. But she's not at home. Doesn't remember the measurements. Also complains of back pain, 6/10. No injury. Since 15 days ago. Not affecting ADLs. No bowel or bladder incontinence. Takes espamofin and it works. No LMP recorded. Last period was more than a year ago. Denies possibility of pregnancy. Documentation:  I communicated with the patient and/or health care decision maker about:  Diagnoses and all orders for this visit:    1. Essential hypertension  -     atenoloL (TENORMIN) 100 mg tablet; Take 1 Tab by mouth daily. For blood pressure. In place of toprol XL 50 mg.    2. DM type 2, goal HbA1c < 7% ([facility])  -     metFORMIN (GLUCOPHAGE) 500 mg tablet; Si po bid with meals. [place] [address] [employer] veces al flores con comida      Follow-up and Dispositions    · Return in about 3 months (around 2020) for hypertension, diabetes. Details of this discussion including any medical advice provided: please measure blood pressures and have available for next time.   Total Time: minutes: 11-20 minutes  I affirm this is a Patient Initiated Episode with a Patient who has not had a related appointment within my department in the past 7 days or scheduled within the next 24 hours. CHRISTIANO Ray [place] Leroy Ly expressed understanding and agreed to this plan.

## 2020-06-16 NOTE — PROGRESS NOTES
Coordination of Care  1. Have you been to the ER, urgent care clinic since your last visit? Hospitalized since your last visit? No    2. Have you seen or consulted any other health care providers outside of the [address] [place] [address] since your last visit? Include any pap smears or colon screening. No    Does the patient need refills? YES    Learning Assessment Complete? no  Depression Screening complete in the past 12 months? yes     Liz Luciano was the .

## 2020-06-25 NOTE — PROGRESS NOTES
I have reviewed the notes, assessments, and/or procedures performed by Adalberto Singh NP, I concur with her documentation of [id number] [facility] [address].

## 2020-09-15 ENCOUNTER — VIRTUAL VISIT (OUTPATIENT)
Dept: FAMILY MEDICINE CLINIC | Age: 35
End: 2020-09-15

## 2020-09-15 DIAGNOSIS — E11.9 DM TYPE 2, GOAL HBA1C < 7% (HCC): ICD-10-CM

## 2020-09-15 DIAGNOSIS — I10 UNCONTROLLED HYPERTENSION: Primary | ICD-10-CM

## 2020-09-15 PROCEDURE — 99442 PR PHYS/QHP TELEPHONE EVALUATION 11-20 MIN: CPT | Performed by: NURSE PRACTITIONER

## 2020-09-15 RX ORDER — LISINOPRIL 5 MG/1
5 TABLET ORAL DAILY
Qty: 90 TAB | Refills: 0 | Status: SHIPPED | OUTPATIENT
Start: 2020-09-15 | End: 2020-10-12 | Stop reason: SDUPTHER

## 2020-09-15 RX ORDER — ATENOLOL 100 MG/1
100 TABLET ORAL DAILY
Qty: 90 TAB | Refills: 0 | Status: SHIPPED | OUTPATIENT
Start: 2020-09-15 | End: 2020-10-12 | Stop reason: SDUPTHER

## 2020-09-15 RX ORDER — METFORMIN HYDROCHLORIDE 500 MG/1
TABLET ORAL
Qty: 60 TAB | Refills: 3 | Status: SHIPPED | OUTPATIENT
Start: 2020-09-15 | End: 2020-10-12 | Stop reason: SDUPTHER

## 2020-09-15 NOTE — PROGRESS NOTES
Coordination of Care  1. Have you been to the ER, urgent care clinic since your last visit? Hospitalized since your last visit? No    2. Have you seen or consulted any other health care providers outside of the [address] [place] [address] since your last visit? Include any pap smears or colon screening. No    Does the patient need refills? YES    Learning Assessment Complete?  yes  Depression Screening complete in the past 12 months? yes

## 2020-09-15 NOTE — PROGRESS NOTES
Max Young is a 36 y.o. female evaluated via telephone at [phone] on 9/15/2020. Patient identification verified with 2 identifiers. Consent: She and/or health care decision maker has provided verbal consent to proceed: Yes Pursuant to the emergency declaration under the [address] [facility], [address] [facility] and the [employer] and [employer] Act, this Virtual Telephone Visit was conducted to reduce the patient's risk of exposure to COVID-19. : Alvina Sarkar  [facility]   Chief Complaint   Patient presents with    Diabetes     f/u    Hypertension     f/u, med refill      Lab Results   Component Value Date/Time    Hemoglobin A1c 6.4 (H) 10/22/2019 12:32 PM   -will need blood pressure measured; needs urine microalb, cmp, a1c, cbc  -the nurse who measured her blood pressure at work today said that it was [place] little high\". She does not know what the numbers were.  -last took atenolol this morning.  --she has measured her blood glucoses at home, doesn't have the numbers with her. Add lisinopril 5 mg daily  No LMP recorded. no chance of pregnancy she states due to \"cysts\"   It's been a long time since she's had her period. Documentation:  I communicated with the patient and/or health care decision maker about:  Diagnoses and all orders for this visit:    1. Uncontrolled hypertension  -     atenoloL (TENORMIN) 100 mg tablet; Take 1 Tab by mouth daily. For blood pressure. In place of toprol XL 50 mg.  -     lisinopriL (PRINIVIL, ZESTRIL) 5 mg tablet; Take 1 Tab by mouth daily.  -     METABOLIC PANEL, COMPREHENSIVE; Future    2. DM type 2, goal HbA1c < 7% ([facility])  -     metFORMIN (GLUCOPHAGE) 500 mg tablet; Si po bid with meals. [place] [address] [employer] veces al flores con comida  -     HEMOGLOBIN A1C WITH EAG; Future  -     CBC WITH AUTOMATED DIFF;  Future  -     MICROALBUMIN, UR, RAND W/ MICROALB/CREAT RATIO; Future    URGENT nonfasting cmp, urine microalb, a1c, cbc with diff    Details of this discussion including any medical advice provided: return F2F 1 month for labs and PE/evaluate bp. Her last K+ was at the low end of normal so I am choosing lisinopril as add on therapy. Discussed do not take lisinopril if pregnant. Total Time: minutes: 11-20 minutes  I affirm this is a Patient Initiated Episode with a Patient who has not had a related appointment within my department in the past 7 days or scheduled within the next 24 hours. Maninder John, CHRISTIANO Harper Emmanuel Agent expressed understanding and agreed to this plan.

## 2020-10-12 ENCOUNTER — OFFICE VISIT (OUTPATIENT)
Dept: FAMILY MEDICINE CLINIC | Age: 35
End: 2020-10-12

## 2020-10-12 ENCOUNTER — HOSPITAL ENCOUNTER (OUTPATIENT)
Dept: LAB | Age: 35
Discharge: HOME | End: 2020-10-12

## 2020-10-12 VITALS
TEMPERATURE: 97.5 F | DIASTOLIC BLOOD PRESSURE: 87 MMHG | BODY MASS INDEX: 36.32 KG/M2 | OXYGEN SATURATION: 100 % | WEIGHT: 205 LBS | HEART RATE: 49 BPM | SYSTOLIC BLOOD PRESSURE: 133 MMHG | HEIGHT: 63 IN

## 2020-10-12 DIAGNOSIS — E11.9 DM TYPE 2, GOAL HBA1C < 7% (HCC): ICD-10-CM

## 2020-10-12 DIAGNOSIS — I10 ESSENTIAL HYPERTENSION: Primary | ICD-10-CM

## 2020-10-12 DIAGNOSIS — Z23 NEEDS FLU SHOT: ICD-10-CM

## 2020-10-12 LAB
ALBUMIN SERPL-MCNC: 4.1 G/DL (ref 3.5–5)
ALBUMIN/GLOB SERPL: 1.1 {RATIO} (ref 1.1–2.2)
ALP SERPL-CCNC: 82 U/L (ref 45–117)
ALT SERPL-CCNC: 44 U/L (ref 12–78)
ANION GAP SERPL CALC-SCNC: 6 MMOL/L (ref 5–15)
AST SERPL-CCNC: 22 U/L (ref 15–37)
BASOPHILS # BLD: 0 K/UL (ref 0–0.1)
BASOPHILS NFR BLD: 0 % (ref 0–1)
BILIRUB SERPL-MCNC: 0.5 MG/DL (ref 0.2–1)
BUN SERPL-MCNC: 14 MG/DL (ref 6–20)
BUN/CREAT SERPL: 17 (ref 12–20)
CALCIUM SERPL-MCNC: 10.1 MG/DL (ref 8.5–10.1)
CHLORIDE SERPL-SCNC: 103 MMOL/L (ref 97–108)
CO2 SERPL-SCNC: 30 MMOL/L (ref 21–32)
CREAT SERPL-MCNC: 0.81 MG/DL (ref 0.55–1.02)
CREAT UR-MCNC: 51.3 MG/DL
DIFFERENTIAL METHOD BLD: NORMAL
EOSINOPHIL # BLD: 0.2 K/UL (ref 0–0.4)
EOSINOPHIL NFR BLD: 3 % (ref 0–7)
ERYTHROCYTE [DISTWIDTH] IN BLOOD BY AUTOMATED COUNT: 12.5 % (ref 11.5–14.5)
EST. AVERAGE GLUCOSE BLD GHB EST-MCNC: 120 MG/DL
GLOBULIN SER CALC-MCNC: 3.6 G/DL (ref 2–4)
GLUCOSE POC: NORMAL MG/DL
GLUCOSE SERPL-MCNC: 96 MG/DL (ref 65–100)
HBA1C MFR BLD: 5.8 % (ref 4–5.6)
HCT VFR BLD AUTO: 42.9 % (ref 35–47)
HGB BLD-MCNC: 13.8 G/DL (ref 11.5–16)
IMM GRANULOCYTES # BLD AUTO: 0 K/UL (ref 0–0.04)
IMM GRANULOCYTES NFR BLD AUTO: 0 % (ref 0–0.5)
LYMPHOCYTES # BLD: 2.4 K/UL (ref 0.8–3.5)
LYMPHOCYTES NFR BLD: 37 % (ref 12–49)
MCH RBC QN AUTO: 27.5 PG (ref 26–34)
MCHC RBC AUTO-ENTMCNC: 32.2 G/DL (ref 30–36.5)
MCV RBC AUTO: 85.5 FL (ref 80–99)
MICROALBUMIN UR-MCNC: 1.01 MG/DL
MICROALBUMIN/CREAT UR-RTO: 20 MG/G (ref 0–30)
MONOCYTES # BLD: 0.5 K/UL (ref 0–1)
MONOCYTES NFR BLD: 8 % (ref 5–13)
NEUTS SEG # BLD: 3.4 K/UL (ref 1.8–8)
NEUTS SEG NFR BLD: 52 % (ref 32–75)
NRBC # BLD: 0 K/UL (ref 0–0.01)
NRBC BLD-RTO: 0 PER 100 WBC
PLATELET # BLD AUTO: 296 K/UL (ref 150–400)
PMV BLD AUTO: 11.3 FL (ref 8.9–12.9)
POTASSIUM SERPL-SCNC: 3.8 MMOL/L (ref 3.5–5.1)
PROT SERPL-MCNC: 7.7 G/DL (ref 6.4–8.2)
RBC # BLD AUTO: 5.02 M/UL (ref 3.8–5.2)
SODIUM SERPL-SCNC: 139 MMOL/L (ref 136–145)
WBC # BLD AUTO: 6.5 K/UL (ref 3.6–11)

## 2020-10-12 PROCEDURE — 80053 COMPREHEN METABOLIC PANEL: CPT

## 2020-10-12 PROCEDURE — 99213 OFFICE O/P EST LOW 20 MIN: CPT | Performed by: NURSE PRACTITIONER

## 2020-10-12 PROCEDURE — 90686 IIV4 VACC NO PRSV 0.5 ML IM: CPT

## 2020-10-12 PROCEDURE — 90471 IMMUNIZATION ADMIN: CPT

## 2020-10-12 PROCEDURE — 82043 UR ALBUMIN QUANTITATIVE: CPT

## 2020-10-12 PROCEDURE — 83036 HEMOGLOBIN GLYCOSYLATED A1C: CPT

## 2020-10-12 PROCEDURE — 82962 GLUCOSE BLOOD TEST: CPT | Performed by: NURSE PRACTITIONER

## 2020-10-12 PROCEDURE — 85025 COMPLETE CBC W/AUTO DIFF WBC: CPT

## 2020-10-12 RX ORDER — ATENOLOL 100 MG/1
100 TABLET ORAL DAILY
Qty: 90 TAB | Refills: 1 | Status: SHIPPED | OUTPATIENT
Start: 2020-10-12

## 2020-10-12 RX ORDER — LISINOPRIL 5 MG/1
5 TABLET ORAL DAILY
Qty: 90 TAB | Refills: 1 | Status: SHIPPED | OUTPATIENT
Start: 2020-10-12

## 2020-10-12 RX ORDER — METFORMIN HYDROCHLORIDE 500 MG/1
TABLET ORAL
Qty: 180 TAB | Refills: 1 | Status: SHIPPED | OUTPATIENT
Start: 2020-10-12

## 2020-10-12 NOTE — PROGRESS NOTES
10/12/2020  Assessment/Plan:   Diagnoses and all orders for this visit:    1. Essential hypertension  -     METABOLIC PANEL, COMPREHENSIVE  -     atenoloL (TENORMIN) 100 mg tablet; Take 1 Tab by mouth daily. For blood pressure. In place of toprol XL 50 mg.  -     lisinopriL (PRINIVIL, ZESTRIL) 5 mg tablet; Take 1 Tab by mouth daily. 2. DM type 2, goal HbA1c < 7% ([facility])  -     MICROALBUMIN, UR, RAND W/ MICROALB/CREAT RATIO  -     CBC WITH AUTOMATED DIFF  -     HEMOGLOBIN A1C WITH EAG  -     AMB POC GLUCOSE BLOOD, BY GLUCOSE MONITORING DEVICE  -     metFORMIN (GLUCOPHAGE) 500 mg tablet; Si po bid with meals. [place] [address] [employer] veces al flores con comida      Returning for labs and physical exam/evaluate HTN. Health Maintenance Due   Topic Date Due    Pneumococcal 0-64 years (1 of  - PPSV23) 10/12/1992    Foot Exam Q1  10/12/1996    MICROALBUMIN Q1  10/12/1996    Eye Exam Retinal or Dilated  10/12/1996    Lipid Screen  10/12/1996    PAP AKA CERVICAL CYTOLOGY  10/12/2007    Flu Vaccine (1) 2020    A1C test (Diabetic or Prediabetic)  10/22/2020     Donnie Zabala, MSN, RN, FNP-BC, BC-ADM  Lauren Carroll expressed understanding of this plan. [facility]  Subjective:   Dannielle Veloz is a 30 y.o. female. Chief Complaint   Patient presents with    Hypertension     F/U    Diabetes     F/U    History of Present Illness: feeling well, no complaints. Lab Results   Component Value Date/Time    Hemoglobin A1c 6.4 (H) 10/22/2019 12:32 PM     Review of Systems: Negative for: fever, chest pain, shortness of breath, leg swelling. Social History: She  reports that she has never smoked. She has never used smokeless tobacco. She reports that she does not drink alcohol or use drugs. Current Medications: has a current medication list which includes the following prescription(s): atenolol, metformin, and lisinopril.   Objective:     Visit Vitals  /87 (BP 1 Location: Left arm, BP Patient Position: Sitting)   Pulse (!) 49   Temp 97.5 °F (36.4 °C) (Temporal)   Ht 5' 3.39\" (1.61 m)   Wt 205 lb (93 kg)   LMP  (LMP Unknown)   SpO2 100%   BMI 35.87 kg/m²      Wt Readings from Last 2 Encounters:   10/12/20 205 lb (93 kg)   02/11/20 200 lb 6.4 oz (90.9 kg)      Results for orders placed or performed in visit on 10/12/20   AMB POC GLUCOSE BLOOD, BY GLUCOSE MONITORING DEVICE   Result Value Ref Range    Glucose POC 83 nf mg/dL      Physical Examination:   General appearance - well developed, no acute distress. Chest - clear to auscultation. Heart - regular rate and rhythm without murmurs, rubs, or gallops. Abdomen - bowel sounds present x 4, NT, ND  Extremities - distal sensation intact, no CCE.

## 2020-10-12 NOTE — PROGRESS NOTES
Coordination of Care  1. Have you been to the ER, urgent care clinic since your last visit? Hospitalized since your last visit? No    2. Have you seen or consulted any other health care providers outside of the [facility] since your last visit? Include any pap smears or colon screening. No    Does the patient need refills? NO    Learning Assessment Complete?  yes  Depression Screening complete in the past 12 months? yes

## 2020-10-13 NOTE — PROGRESS NOTES
Blood count, metabolic panel, renal function, and diabetes control look good. Continue current medications.

## 2020-10-15 NOTE — PROGRESS NOTES
Tc to the pt [facility] int # [id number]. The pt was given the providers lab results message Blood count, metabolic panel, renal function, and diabetes control look good. Continue current medications. The pt verbalized understanding and had no further questions.  Barbie Corea RN

## 2022-03-19 PROBLEM — I10 ESSENTIAL HYPERTENSION: Status: ACTIVE | Noted: 2020-02-11

## 2022-03-20 PROBLEM — E66.01 SEVERE OBESITY (HCC): Status: ACTIVE | Noted: 2019-10-22

## 2023-05-24 RX ORDER — LISINOPRIL 5 MG/1
5 TABLET ORAL DAILY
COMMUNITY
Start: 2020-10-12

## 2023-05-24 RX ORDER — ATENOLOL 100 MG/1
100 TABLET ORAL DAILY
COMMUNITY
Start: 2020-10-12

## 2024-03-23 ENCOUNTER — HOSPITAL ENCOUNTER (EMERGENCY)
Facility: HOSPITAL | Age: 39
Discharge: HOME | End: 2024-03-23
Attending: EMERGENCY MEDICINE

## 2024-03-23 VITALS
WEIGHT: 199.96 LBS | HEIGHT: 65 IN | RESPIRATION RATE: 20 BRPM | OXYGEN SATURATION: 99 % | TEMPERATURE: 98.4 F | DIASTOLIC BLOOD PRESSURE: 95 MMHG | HEART RATE: 57 BPM | SYSTOLIC BLOOD PRESSURE: 155 MMHG | BODY MASS INDEX: 33.31 KG/M2

## 2024-03-23 DIAGNOSIS — I10 UNCONTROLLED HYPERTENSION: Primary | ICD-10-CM

## 2024-03-23 LAB
ALBUMIN SERPL-MCNC: 4.2 G/DL (ref 3.5–5)
ALBUMIN/GLOB SERPL: 1 (ref 1.1–2.2)
ALP SERPL-CCNC: 75 U/L (ref 45–117)
ALT SERPL-CCNC: 31 U/L (ref 12–78)
ANION GAP SERPL CALC-SCNC: 4 MMOL/L (ref 5–15)
APPEARANCE UR: CLEAR
AST SERPL-CCNC: 18 U/L (ref 15–37)
BACTERIA URNS QL MICRO: NEGATIVE /HPF
BASOPHILS # BLD: 0 K/UL (ref 0–0.1)
BASOPHILS NFR BLD: 0 % (ref 0–1)
BILIRUB SERPL-MCNC: 0.9 MG/DL (ref 0.2–1)
BILIRUB UR QL: NEGATIVE
BUN SERPL-MCNC: 11 MG/DL (ref 6–20)
BUN/CREAT SERPL: 16 (ref 12–20)
CALCIUM SERPL-MCNC: 9.4 MG/DL (ref 8.5–10.1)
CHLORIDE SERPL-SCNC: 105 MMOL/L (ref 97–108)
CO2 SERPL-SCNC: 30 MMOL/L (ref 21–32)
COLOR UR: ABNORMAL
CREAT SERPL-MCNC: 0.7 MG/DL (ref 0.55–1.02)
DIFFERENTIAL METHOD BLD: ABNORMAL
EOSINOPHIL # BLD: 0.1 K/UL (ref 0–0.4)
EOSINOPHIL NFR BLD: 1 % (ref 0–7)
EPITH CASTS URNS QL MICRO: ABNORMAL /LPF
ERYTHROCYTE [DISTWIDTH] IN BLOOD BY AUTOMATED COUNT: 13.2 % (ref 11.5–14.5)
GLOBULIN SER CALC-MCNC: 4.3 G/DL (ref 2–4)
GLUCOSE SERPL-MCNC: 115 MG/DL (ref 65–100)
GLUCOSE UR STRIP.AUTO-MCNC: NEGATIVE MG/DL
HCT VFR BLD AUTO: 43.6 % (ref 35–47)
HGB BLD-MCNC: 14.6 G/DL (ref 11.5–16)
HGB UR QL STRIP: NEGATIVE
HYALINE CASTS URNS QL MICRO: ABNORMAL /LPF (ref 0–2)
IMM GRANULOCYTES # BLD AUTO: 0 K/UL (ref 0–0.04)
IMM GRANULOCYTES NFR BLD AUTO: 0 % (ref 0–0.5)
KETONES UR QL STRIP.AUTO: NEGATIVE MG/DL
LEUKOCYTE ESTERASE UR QL STRIP.AUTO: ABNORMAL
LYMPHOCYTES # BLD: 2 K/UL (ref 0.8–3.5)
LYMPHOCYTES NFR BLD: 26 % (ref 12–49)
MCH RBC QN AUTO: 27.7 PG (ref 26–34)
MCHC RBC AUTO-ENTMCNC: 33.5 G/DL (ref 30–36.5)
MCV RBC AUTO: 82.6 FL (ref 80–99)
MONOCYTES # BLD: 0.5 K/UL (ref 0–1)
MONOCYTES NFR BLD: 7 % (ref 5–13)
NEUTS SEG # BLD: 5 K/UL (ref 1.8–8)
NEUTS SEG NFR BLD: 66 % (ref 32–75)
NITRITE UR QL STRIP.AUTO: NEGATIVE
NRBC # BLD: 0 K/UL (ref 0–0.01)
NRBC BLD-RTO: 0 PER 100 WBC
PH UR STRIP: 7.5 (ref 5–8)
PLATELET # BLD AUTO: 377 K/UL (ref 150–400)
PMV BLD AUTO: 9.5 FL (ref 8.9–12.9)
POTASSIUM SERPL-SCNC: 2.9 MMOL/L (ref 3.5–5.1)
PROT SERPL-MCNC: 8.5 G/DL (ref 6.4–8.2)
PROT UR STRIP-MCNC: NEGATIVE MG/DL
RBC # BLD AUTO: 5.28 M/UL (ref 3.8–5.2)
RBC #/AREA URNS HPF: ABNORMAL /HPF (ref 0–5)
SODIUM SERPL-SCNC: 139 MMOL/L (ref 136–145)
SP GR UR REFRACTOMETRY: 1 (ref 1–1.03)
SPECIMEN HOLD: NORMAL
UROBILINOGEN UR QL STRIP.AUTO: 0.2 EU/DL (ref 0.2–1)
WBC # BLD AUTO: 7.7 K/UL (ref 3.6–11)
WBC URNS QL MICRO: ABNORMAL /HPF (ref 0–4)

## 2024-03-23 PROCEDURE — 6370000000 HC RX 637 (ALT 250 FOR IP): Performed by: EMERGENCY MEDICINE

## 2024-03-23 PROCEDURE — 96374 THER/PROPH/DIAG INJ IV PUSH: CPT

## 2024-03-23 PROCEDURE — 81001 URINALYSIS AUTO W/SCOPE: CPT

## 2024-03-23 PROCEDURE — 36415 COLL VENOUS BLD VENIPUNCTURE: CPT

## 2024-03-23 PROCEDURE — 99284 EMERGENCY DEPT VISIT MOD MDM: CPT

## 2024-03-23 PROCEDURE — 85025 COMPLETE CBC W/AUTO DIFF WBC: CPT

## 2024-03-23 PROCEDURE — 6360000002 HC RX W HCPCS: Performed by: EMERGENCY MEDICINE

## 2024-03-23 PROCEDURE — 80053 COMPREHEN METABOLIC PANEL: CPT

## 2024-03-23 RX ORDER — POTASSIUM CHLORIDE 750 MG/1
40 TABLET, FILM COATED, EXTENDED RELEASE ORAL ONCE
Status: COMPLETED | OUTPATIENT
Start: 2024-03-23 | End: 2024-03-23

## 2024-03-23 RX ORDER — LABETALOL HYDROCHLORIDE 5 MG/ML
20 INJECTION, SOLUTION INTRAVENOUS ONCE
Status: COMPLETED | OUTPATIENT
Start: 2024-03-23 | End: 2024-03-23

## 2024-03-23 RX ADMIN — LABETALOL HYDROCHLORIDE 20 MG: 5 INJECTION, SOLUTION INTRAVENOUS at 15:43

## 2024-03-23 RX ADMIN — POTASSIUM CHLORIDE 40 MEQ: 750 TABLET, EXTENDED RELEASE ORAL at 15:42

## 2024-03-23 ASSESSMENT — PAIN - FUNCTIONAL ASSESSMENT: PAIN_FUNCTIONAL_ASSESSMENT: 0-10

## 2024-03-23 ASSESSMENT — PAIN DESCRIPTION - ORIENTATION: ORIENTATION: MID

## 2024-03-23 ASSESSMENT — ENCOUNTER SYMPTOMS
ABDOMINAL PAIN: 0
SHORTNESS OF BREATH: 0
VOMITING: 0
EYE PAIN: 0
CHEST TIGHTNESS: 0
BACK PAIN: 0
SORE THROAT: 0

## 2024-03-23 ASSESSMENT — PAIN SCALES - GENERAL: PAINLEVEL_OUTOF10: 4

## 2024-03-23 ASSESSMENT — PAIN DESCRIPTION - LOCATION: LOCATION: HEAD

## 2024-03-23 ASSESSMENT — PAIN DESCRIPTION - DESCRIPTORS: DESCRIPTORS: ACHING

## 2024-03-23 NOTE — ED PROVIDER NOTES
[facility] EMERGENCY DEPT  EMERGENCY DEPARTMENT ENCOUNTER      Pt Name: Ja Patel  MRN: [id number]  Birthdate 1985  Date of evaluation: 3/23/2024  Provider: Robert Mccormick MD      HISTORY OF PRESENT ILLNESS      38-year-old female with history of hypertension presenting to the ER for elevated blood pressure.  Patient reports she went to urgent care earlier to take a pregnancy test.  Her last menstrual period was January 23.  States that her pregnancy test was positive.  She also was found to be hypertensive there.  They prescribed her labetalol 100 mg tablets to start taking as well as a prenatal vitamin.  She reports she was referred to the ER for her elevated blood pressure and an incidental low potassium of 2.8 during her blood work there.  She does not report any abdominal pain or vaginal bleeding.  Does not report any chest pain or dyspnea or stroke symptoms.  She has had hypertension previously in the past but is not currently on any medicines.  She has not yet seen an OB/GYN for this pregnancy.    History obtained with the assistance of .              Nursing Notes were reviewed.    REVIEW OF SYSTEMS         Review of Systems   Constitutional:  Negative for chills and fever.   HENT:  Negative for congestion and sore throat.    Eyes:  Negative for pain and visual disturbance.   Respiratory:  Negative for chest tightness and shortness of breath.    Cardiovascular:  Negative for chest pain and leg swelling.   Gastrointestinal:  Negative for abdominal pain and vomiting.   Genitourinary:  Negative for dysuria and vaginal bleeding.   Musculoskeletal:  Negative for back pain.   Skin:  Negative for rash.   Neurological:  Negative for weakness and headaches.   All other systems reviewed and are negative.          PAST MEDICAL HISTORY   No past medical history on file.      SURGICAL HISTORY     No past surgical history on file.      CURRENT MEDICATIONS       Previous Medications

## 2024-03-23 NOTE — ED TRIAGE NOTES
Patient arrives to the ED, patient reports she is about 8 weeks pregnant, went to a clinic who stated her blood pressure was high and her potassium was low. Patient reports hx HTN, does not take medicine for it. Pt does not have PCP.

## 2024-03-24 LAB
EKG ATRIAL RATE: 68 BPM
EKG DIAGNOSIS: NORMAL
EKG P AXIS: 19 DEGREES
EKG P-R INTERVAL: 180 MS
EKG Q-T INTERVAL: 412 MS
EKG QRS DURATION: 104 MS
EKG QTC CALCULATION (BAZETT): 438 MS
EKG R AXIS: -27 DEGREES
EKG T AXIS: -14 DEGREES
EKG VENTRICULAR RATE: 68 BPM

## 2024-04-13 ENCOUNTER — HOSPITAL ENCOUNTER (EMERGENCY)
Facility: HOSPITAL | Age: 39
Discharge: HOME | End: 2024-04-13
Attending: EMERGENCY MEDICINE

## 2024-04-13 ENCOUNTER — APPOINTMENT (OUTPATIENT)
Facility: HOSPITAL | Age: 39
End: 2024-04-13

## 2024-04-13 VITALS
RESPIRATION RATE: 18 BRPM | TEMPERATURE: 98 F | DIASTOLIC BLOOD PRESSURE: 129 MMHG | BODY MASS INDEX: 33.75 KG/M2 | HEART RATE: 69 BPM | SYSTOLIC BLOOD PRESSURE: 164 MMHG | WEIGHT: 205 LBS | OXYGEN SATURATION: 99 %

## 2024-04-13 DIAGNOSIS — R03.0 ELEVATED BLOOD PRESSURE READING: ICD-10-CM

## 2024-04-13 DIAGNOSIS — O03.9 COMPLETE MISCARRIAGE: Primary | ICD-10-CM

## 2024-04-13 LAB
ALBUMIN SERPL-MCNC: 4.4 G/DL (ref 3.5–5.2)
ALBUMIN/GLOB SERPL: 1.3 (ref 1.1–2.2)
ALP SERPL-CCNC: 84 U/L (ref 35–104)
ALT SERPL-CCNC: 23 U/L (ref 10–35)
ANION GAP SERPL CALC-SCNC: 12 MMOL/L (ref 5–15)
APPEARANCE UR: ABNORMAL
AST SERPL-CCNC: 22 U/L (ref 10–35)
BACTERIA URNS QL MICRO: NEGATIVE /HPF
BASOPHILS # BLD: 0 K/UL (ref 0–1)
BASOPHILS NFR BLD: 0 % (ref 0–1)
BILIRUB SERPL-MCNC: 0.3 MG/DL (ref 0.2–1)
BILIRUB UR QL: NEGATIVE
BUN SERPL-MCNC: 9 MG/DL (ref 6–20)
BUN/CREAT SERPL: ABNORMAL (ref 12–20)
CALCIUM SERPL-MCNC: 9.3 MG/DL (ref 8.6–10)
CHLORIDE SERPL-SCNC: 102 MMOL/L (ref 98–107)
CO2 SERPL-SCNC: 26 MMOL/L (ref 22–29)
COLOR UR: ABNORMAL
COMMENT:: NORMAL
CREAT SERPL-MCNC: <0.47 MG/DL (ref 0.5–0.9)
DIFFERENTIAL METHOD BLD: ABNORMAL
EOSINOPHIL # BLD: 0.2 K/UL (ref 0–0.4)
EOSINOPHIL NFR BLD: 3 %
EPITH CASTS URNS QL MICRO: ABNORMAL /LPF
ERYTHROCYTE [DISTWIDTH] IN BLOOD BY AUTOMATED COUNT: 13.2 % (ref 11.5–14.5)
GLOBULIN SER CALC-MCNC: 3.3 G/DL (ref 2–4)
GLUCOSE SERPL-MCNC: 149 MG/DL (ref 65–100)
GLUCOSE UR STRIP.AUTO-MCNC: NEGATIVE MG/DL
HCG SERPL-ACNC: 5950 MIU/ML
HCT VFR BLD AUTO: 40.4 % (ref 35–47)
HGB BLD-MCNC: 13.6 G/DL (ref 11.5–16)
HGB UR QL STRIP: ABNORMAL
IMM GRANULOCYTES # BLD AUTO: 0 K/UL (ref 0–0.04)
IMM GRANULOCYTES NFR BLD AUTO: 0 % (ref 0–0.5)
KETONES UR QL STRIP.AUTO: NEGATIVE MG/DL
LDH SERPL L TO P-CCNC: 161 U/L (ref 81–246)
LEUKOCYTE ESTERASE UR QL STRIP.AUTO: ABNORMAL
LYMPHOCYTES # BLD: 1.9 K/UL (ref 0.8–3.5)
LYMPHOCYTES NFR BLD: 25 % (ref 12–49)
MCH RBC QN AUTO: 27.8 PG (ref 26–34)
MCHC RBC AUTO-ENTMCNC: 33.7 G/DL (ref 30–36.5)
MCV RBC AUTO: 82.4 FL (ref 80–99)
MONOCYTES # BLD: 0.6 K/UL (ref 0–1)
MONOCYTES NFR BLD: 8 % (ref 5–13)
NEUTS SEG # BLD: 4.9 K/UL (ref 1.8–8)
NEUTS SEG NFR BLD: 64 % (ref 32–75)
NITRITE UR QL STRIP.AUTO: NEGATIVE
NRBC # BLD: 0 K/UL (ref 0–0.01)
NRBC BLD-RTO: 0 PER 100 WBC
PH UR STRIP: 7.5 (ref 5–8)
PLATELET # BLD AUTO: 330 K/UL (ref 150–400)
PMV BLD AUTO: 9.4 FL (ref 8.9–12.9)
POTASSIUM SERPL-SCNC: 3.4 MMOL/L (ref 3.5–5.1)
PROT SERPL-MCNC: 7.7 G/DL (ref 6.4–8.3)
PROT UR STRIP-MCNC: 30 MG/DL
RBC # BLD AUTO: 4.9 M/UL (ref 3.8–5.2)
RBC #/AREA URNS HPF: >100 /HPF
SODIUM SERPL-SCNC: 140 MMOL/L (ref 136–145)
SP GR UR REFRACTOMETRY: 1.01 (ref 1–1.03)
SPECIMEN HOLD: NORMAL
SPECIMEN HOLD: NORMAL
UROBILINOGEN UR QL STRIP.AUTO: 0.2 EU/DL (ref 0.2–1)
WBC # BLD AUTO: 7.6 K/UL (ref 3.6–11)
WBC URNS QL MICRO: ABNORMAL /HPF (ref 0–4)

## 2024-04-13 PROCEDURE — 99285 EMERGENCY DEPT VISIT HI MDM: CPT

## 2024-04-13 PROCEDURE — 85025 COMPLETE CBC W/AUTO DIFF WBC: CPT

## 2024-04-13 PROCEDURE — 76817 TRANSVAGINAL US OBSTETRIC: CPT

## 2024-04-13 PROCEDURE — 6360000002 HC RX W HCPCS: Performed by: EMERGENCY MEDICINE

## 2024-04-13 PROCEDURE — 81001 URINALYSIS AUTO W/SCOPE: CPT

## 2024-04-13 PROCEDURE — 83615 LACTATE (LD) (LDH) ENZYME: CPT

## 2024-04-13 PROCEDURE — 96374 THER/PROPH/DIAG INJ IV PUSH: CPT

## 2024-04-13 PROCEDURE — 71045 X-RAY EXAM CHEST 1 VIEW: CPT

## 2024-04-13 PROCEDURE — 93005 ELECTROCARDIOGRAM TRACING: CPT | Performed by: EMERGENCY MEDICINE

## 2024-04-13 PROCEDURE — 6370000000 HC RX 637 (ALT 250 FOR IP): Performed by: EMERGENCY MEDICINE

## 2024-04-13 PROCEDURE — 80053 COMPREHEN METABOLIC PANEL: CPT

## 2024-04-13 PROCEDURE — 84702 CHORIONIC GONADOTROPIN TEST: CPT

## 2024-04-13 PROCEDURE — 36415 COLL VENOUS BLD VENIPUNCTURE: CPT

## 2024-04-13 RX ORDER — LABETALOL HYDROCHLORIDE 5 MG/ML
20 INJECTION, SOLUTION INTRAVENOUS ONCE
Status: COMPLETED | OUTPATIENT
Start: 2024-04-13 | End: 2024-04-13

## 2024-04-13 RX ORDER — LABETALOL HYDROCHLORIDE 5 MG/ML
10 INJECTION, SOLUTION INTRAVENOUS ONCE
Status: DISCONTINUED | OUTPATIENT
Start: 2024-04-13 | End: 2024-04-13

## 2024-04-13 RX ORDER — LABETALOL 100 MG/1
100 TABLET, FILM COATED ORAL 2 TIMES DAILY
Qty: 60 TABLET | Refills: 0 | Status: SHIPPED | OUTPATIENT
Start: 2024-04-13 | End: 2024-05-13

## 2024-04-13 RX ORDER — LABETALOL 100 MG/1
100 TABLET, FILM COATED ORAL
Status: COMPLETED | OUTPATIENT
Start: 2024-04-13 | End: 2024-04-13

## 2024-04-13 RX ADMIN — LABETALOL HYDROCHLORIDE 20 MG: 5 INJECTION, SOLUTION INTRAVENOUS at 14:01

## 2024-04-13 RX ADMIN — LABETALOL HYDROCHLORIDE 100 MG: 100 TABLET, FILM COATED ORAL at 16:44

## 2024-04-13 ASSESSMENT — ENCOUNTER SYMPTOMS
COUGH: 0
SORE THROAT: 0
VOMITING: 0

## 2024-04-13 ASSESSMENT — PAIN DESCRIPTION - FREQUENCY: FREQUENCY: INTERMITTENT

## 2024-04-13 ASSESSMENT — PAIN DESCRIPTION - LOCATION: LOCATION: ABDOMEN

## 2024-04-13 ASSESSMENT — PAIN - FUNCTIONAL ASSESSMENT
PAIN_FUNCTIONAL_ASSESSMENT: 0-10
PAIN_FUNCTIONAL_ASSESSMENT: ACTIVITIES ARE NOT PREVENTED

## 2024-04-13 ASSESSMENT — PAIN DESCRIPTION - PAIN TYPE: TYPE: ACUTE PAIN

## 2024-04-13 ASSESSMENT — PAIN SCALES - GENERAL: PAINLEVEL_OUTOF10: 7

## 2024-04-13 ASSESSMENT — LIFESTYLE VARIABLES
HOW MANY STANDARD DRINKS CONTAINING ALCOHOL DO YOU HAVE ON A TYPICAL DAY: PATIENT DOES NOT DRINK
HOW OFTEN DO YOU HAVE A DRINK CONTAINING ALCOHOL: NEVER

## 2024-04-13 ASSESSMENT — PAIN DESCRIPTION - ONSET: ONSET: GRADUAL

## 2024-04-13 NOTE — ED PROVIDER NOTES
[facility] EMERGENCY DEPT  EMERGENCY DEPARTMENT ENCOUNTER      Pt Name: Ja Patel  MRN: [id number]  Birthdate 1985  Date of evaluation: 4/13/2024  Provider: Fabio Santiago MD    CHIEF COMPLAINT       Chief Complaint   Patient presents with    Threatened Miscarriage    Pre-Eclampsia         HISTORY OF PRESENT ILLNESS   (Location/Symptom, Timing/Onset, Context/Setting, Quality, Duration, Modifying Factors, Severity)  Note limiting factors.   38-year-old female presents from home reporting pregnancy and vaginal bleeding.  LMP reported 1/23 making her about 12 weeks pregnant.  She states that she got up from her seat this morning and felt some bloody discharge.  Patient noted to have a markedly elevated blood pressure.  She states she is not taking any medication for blood pressure.  Patient was seen here 3 weeks ago with reports of high blood pressure.  She was started on labetalol at that time but states that she has not been taking it and she has not followed up with OB doctor.    The history is provided by the patient, a relative and medical records.         Review of External Medical Records:     Nursing Notes were reviewed.    REVIEW OF SYSTEMS    (2-9 systems for level 4, 10 or more for level 5)     Review of Systems   Constitutional:  Negative for fatigue.   HENT:  Negative for sore throat.    Eyes:  Negative for visual disturbance.   Respiratory:  Negative for cough.    Cardiovascular:  Negative for palpitations.   Gastrointestinal:  Negative for vomiting.   Genitourinary:  Negative for difficulty urinating.   Musculoskeletal:  Negative for myalgias.   Skin:  Negative for rash.   Neurological:  Negative for weakness.       Except as noted above the remainder of the review of systems was reviewed and negative.       PAST MEDICAL HISTORY   History reviewed. No pertinent past medical history.      SURGICAL HISTORY     History reviewed. No pertinent surgical history.      CURRENT MEDICATIONS

## 2024-04-14 LAB
EKG ATRIAL RATE: 75 BPM
EKG DIAGNOSIS: NORMAL
EKG P AXIS: 26 DEGREES
EKG P-R INTERVAL: 166 MS
EKG Q-T INTERVAL: 410 MS
EKG QRS DURATION: 98 MS
EKG QTC CALCULATION (BAZETT): 457 MS
EKG R AXIS: -4 DEGREES
EKG T AXIS: 11 DEGREES
EKG VENTRICULAR RATE: 75 BPM

## 2024-04-14 PROCEDURE — 93010 ELECTROCARDIOGRAM REPORT: CPT | Performed by: SPECIALIST

## 2025-07-23 NOTE — PROGRESS NOTES
2nd attempt, lvm, letter sent    Assessment/Plan:    Diagnoses and all orders for this visit:    1. Elevated hemoglobin A1c  -     AMB POC GLUCOSE BLOOD, BY GLUCOSE MONITORING DEVICE    2. DM type 2, goal A1C to be determined ([facility])    3. Obesity (BMI 35.0-39.9 without comorbidity)  -     REFERRAL TO NUTRITION    4. Essential hypertension  -     metoprolol succinate (TOPROL-XL) 50 mg XL tablet; Take 1 Tab by mouth daily. 5. DM type 2, goal HbA1c < 7% ([facility])  -     metFORMIN (GLUCOPHAGE) 500 mg tablet; Si po bid with meals. [place] [address] [employer] veces al flores con comida  -     REFERRAL TO NUTRITION    6. Encounter for immunization    Desires pregnancy  Lifestyle changes encouraged and discussed  Take metformin bid one pill with different meals, she did not tolerate 2 at same time   Recheck bp on toprol xl in 4 weeks, safer in case of pregnancy    Follow-up and Dispositions    · Return in about 4 weeks (around 12/10/2019). CARMELLA Juares Emmanuel Agent expressed understanding of this plan. An AVS was printed and given to the patient.      ----------------------------------------------------------------------    Chief Complaint   Patient presents with    Hypertension     f/u    High Blood Sugar       History of Present Illness:    Pt here for scheduled f/up on recent start of metformin for PCOS and for an a1c of 6.4. This was explained to her in more detail about why she needs the medication. She was instructed to increase her medication to 2 pills at dinner but she did not tolerate this dose so she cut back to 1 pill with dinner and has had no side effects with this does. Additionally, she was to come back to have her bp rechecked. She has persistently elevated bp and I have advised her that she would benefit from a bp med. She is interested and open to pregnancy at this time so will select a \"safer\" bp medication. No past medical history on file.     Current Outpatient Medications   Medication Sig Dispense Refill    metFORMIN (GLUCOPHAGE) 500 mg tablet Si po bid with meals. [place] 1 [employer] veces al flores con comida 60 Tab 2    metoprolol succinate (TOPROL-XL) 50 mg XL tablet Take 1 Tab by mouth daily. 30 Tab 2       No Known Allergies    Social History     Tobacco Use    Smoking status: Never Smoker    Smokeless tobacco: Never Used   Substance Use Topics    Alcohol use: Never     Frequency: Never    Drug use: Never       No family history on file.     Physical Exam:     Visit Vitals  BP (!) 154/99 (BP 1 Location: Left arm, BP Patient Position: Sitting)   Pulse 82   Temp 98.2 °F (36.8 °C) (Temporal)   Ht 5' 4.57\" (1.64 m)   Wt 207 lb (93.9 kg)   SpO2 99%   BMI 34.91 kg/m²       A&Ox3  WDWN NAD  Respirations normal and non labored